# Patient Record
Sex: MALE | Race: WHITE | URBAN - METROPOLITAN AREA
[De-identification: names, ages, dates, MRNs, and addresses within clinical notes are randomized per-mention and may not be internally consistent; named-entity substitution may affect disease eponyms.]

---

## 2019-12-31 ENCOUNTER — OFFICE VISIT (OUTPATIENT)
Dept: ORTHOPEDIC SURGERY | Age: 64
End: 2019-12-31
Payer: COMMERCIAL

## 2019-12-31 VITALS
HEART RATE: 79 BPM | DIASTOLIC BLOOD PRESSURE: 71 MMHG | HEIGHT: 71 IN | SYSTOLIC BLOOD PRESSURE: 123 MMHG | WEIGHT: 180 LBS | BODY MASS INDEX: 25.2 KG/M2

## 2019-12-31 DIAGNOSIS — M25.562 LEFT KNEE PAIN, UNSPECIFIED CHRONICITY: Primary | ICD-10-CM

## 2019-12-31 PROCEDURE — 99203 OFFICE O/P NEW LOW 30 MIN: CPT | Performed by: ORTHOPAEDIC SURGERY

## 2020-01-17 ENCOUNTER — OFFICE VISIT (OUTPATIENT)
Dept: ORTHOPEDIC SURGERY | Age: 65
End: 2020-01-17
Payer: COMMERCIAL

## 2020-01-17 VITALS — BODY MASS INDEX: 25.19 KG/M2 | HEIGHT: 71 IN | WEIGHT: 179.9 LBS

## 2020-01-17 PROCEDURE — 99213 OFFICE O/P EST LOW 20 MIN: CPT | Performed by: ORTHOPAEDIC SURGERY

## 2020-01-17 RX ORDER — MELOXICAM 15 MG/1
15 TABLET ORAL DAILY
Qty: 90 TABLET | Refills: 1 | Status: SHIPPED | OUTPATIENT
Start: 2020-01-17

## 2020-01-17 NOTE — PROGRESS NOTES
Chief Complaint  Follow-up (Left Knee: MRI review )      History of Present Illness:  Aditi Vieyra is a 60 y.o. y/o male who presents today for follow up of his left knee. He is here today to review his MRI. He continues having some medial sided pain, but only very occasional catching. He has been taking some ibuprofen off and on. Medical History  History reviewed. No pertinent past medical history. History reviewed. No pertinent surgical history. Social History     Socioeconomic History    Marital status:      Spouse name: Not on file    Number of children: Not on file    Years of education: Not on file    Highest education level: Not on file   Occupational History    Not on file   Social Needs    Financial resource strain: Not on file    Food insecurity:     Worry: Not on file     Inability: Not on file    Transportation needs:     Medical: Not on file     Non-medical: Not on file   Tobacco Use    Smoking status: Never Smoker    Smokeless tobacco: Never Used   Substance and Sexual Activity    Alcohol use: Not on file    Drug use: Not on file    Sexual activity: Not on file   Lifestyle    Physical activity:     Days per week: Not on file     Minutes per session: Not on file    Stress: Not on file   Relationships    Social connections:     Talks on phone: Not on file     Gets together: Not on file     Attends [employer] service: Not on file     Active member of club or organization: Not on file     Attends meetings of clubs or organizations: Not on file     Relationship status: Not on file    Intimate partner violence:     Fear of current or ex partner: Not on file     Emotionally abused: Not on file     Physically abused: Not on file     Forced sexual activity: Not on file   Other Topics Concern    Not on file   Social History Narrative    Not on file       History reviewed. No pertinent family history.      Review of Systems  Pertinent items are noted in HPI  Review of systems osteochondral defect of the    patellar or trochlear cartilage.  Mild peripheral spurring of the medial and lateral patellar    facets.       Enthesophyte formation of the patella at the distal quadriceps tendon attachment, with mild    adjacent tendinopathy. The patellar tendon and flexor mechanism are unremarkable.       No traumatic tear or injury of the ACL, PCL, MCL, or lateral collateral complex.  No    posterolateral or posteromedial corner injury.       Chronic, complex flap tear of the medial meniscus, extending from posterior root to midbody,    3-3.5 cm in length, with large, complex parameniscal pseudocyst formation projecting from the    posterior body-horn junction; cyst measures 3.9 x 1 x 2 cm, extending along the entirety of the    posteromedial joint line.  No acute, active, or traumatic lateral meniscus tear.       Trace joint effusion, with no intra-articular bodies.       Mild to moderate chondral thinning of the medial femoral condyle and tibial plateau    weight-bearing surfaces, without penetration of the subchondral plate or reactive osteoedema.     Mild joint line and notch spurring.       Femorotibial shift present. Mild chondral thinning of the lateral femorotibial weight-bearing    surfaces, with minimal joint line and notch spurring.       No acute or traumatic bony injury.       No acute or traumatic muscle tear or injury.  Mild pes anserine bursitis.  Baker's cyst,    measuring 4.8 cm in craniocaudal height.  The neurovascular bundle is intact.                   CONCLUSION:   1. Chronic, complex flap tear of the medial meniscus, extending from posterior root to midbody,    3-3.5 cm in length, with large, complex parameniscal pseudocyst formation projecting from the    posterior body-horn junction. 2. 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    3. Mild pes anserine bursitis, with adjacent Baker's cyst.   4. No traumatic lateral meniscus tear. No traumatic cruciate or collateral ligament injury. Assessment:  44-year-old male with mild osteoarthritis of the left knee and a medial meniscus tear    Office Procedures:  No orders of the defined types were placed in this encounter. Plan:   -Discussed his condition at length. We discussed the natural history of this condition as well as treatment options both surgical nonsurgical.  At this point he wishes to avoid surgery if possible. He also wishes to avoid an injection unless absolutely necessary.  -We will provide him with a prescription for meloxicam at this time. We discussed medications, ice, activity modification. We discussed this recommendation risk. We discussed use not take any additional NSAIDs  The patient was advised that NSAID-type medications have two very important potential side effects: gastrointestinal irritation including hemorrhage and renal injuries. They were asked to take the medication with food and to stop if they experience any GI upset. They were instructed to call for vomiting, abdominal pain or black/bloody stools. Renal function testing should be provided per primary care provider periodically. The patient expresses understanding of these issues and questions were answered. -If in 1 month he continues having significant symptoms we will see him back and provide him with a corticosteroid injection in his knee  -We discussed the possibility of surgery if he continues having symptoms with a knee arthroscopy and partial medial meniscectomy  -If there are issues interim he will contact the office    MD Lorena Galarza 60 partner of [facility])          Voice Recognition Dictation disclaimer: Please note that portions of this chart were generated using Dragon dictation software.  Although every effort was made to ensure the accuracy of this automated transcription, some errors in transcription may have

## 2020-02-14 ENCOUNTER — OFFICE VISIT (OUTPATIENT)
Dept: ORTHOPEDIC SURGERY | Age: 65
End: 2020-02-14
Payer: COMMERCIAL

## 2020-02-14 VITALS — BODY MASS INDEX: 25.19 KG/M2 | HEIGHT: 71 IN | WEIGHT: 179.9 LBS

## 2020-02-14 PROCEDURE — 20610 DRAIN/INJ JOINT/BURSA W/O US: CPT | Performed by: ORTHOPAEDIC SURGERY

## 2020-02-14 PROCEDURE — 99213 OFFICE O/P EST LOW 20 MIN: CPT | Performed by: ORTHOPAEDIC SURGERY

## 2020-02-14 RX ORDER — TRIAMCINOLONE ACETONIDE 40 MG/ML
40 INJECTION, SUSPENSION INTRA-ARTICULAR; INTRAMUSCULAR ONCE
Status: COMPLETED | OUTPATIENT
Start: 2020-02-14 | End: 2020-02-14

## 2020-02-14 RX ADMIN — TRIAMCINOLONE ACETONIDE 40 MG: 40 INJECTION, SUSPENSION INTRA-ARTICULAR; INTRAMUSCULAR at 11:10

## 2020-02-14 NOTE — PROGRESS NOTES
History Narrative    Not on file       History reviewed. No pertinent family history. Review of Systems  Pertinent items are noted in HPI  Review of systems reviewed from Patient History Form dated on 12/31/2019 and available in the patient's chart under the Media tab. Vital Signs  There were no vitals filed for this visit. General Exam:   Constitutional: Patient is adequately groomed with no evidence of malnutrition  Mental Status: The patient is oriented to time, place and person. The patient's mood and affect are appropriate. Lymphatic: The lymphatic examination bilaterally reveals all areas to be without enlargement or induration. Neurological: The patient has good coordination. There is no weakness or sensory deficit. Gait: Mildly antalgic    Left knee examination  Inspection: No effusion, no erythema    Palpation: Tender medial joint line, no lateral tenderness today    Range of Motion: 0-140    Sensation: In tact to light touch all nerve distributions     Strength: Knee flexion extension motor intact with good quad tone and a normal distal motor exam    Special Tests: Positive pain with Erica, stable anterior posterior drawer, stable Lachman, stable varus and valgus    Skin: There are no additional worrisome rashes, ulcerations or lesions. Circulation normal    Additional Examinations:  Right Lower Extremity: Examination of the right lower extremity does not show any tenderness, deformity or injury. Range of motion is unremarkable. There is no gross instability. There are no rashes, ulcerations or lesions.   Strength and tone are normal.      Radiology:     X-rays obtained and reviewed in office:  No new imaging      Assessment:  57-year-old male with left knee medial meniscus tear mild osteoarthritis    Office Procedures:  Orders Placed This Encounter   Procedures    ND ARTHROCENTESIS ASPIR&/INJ MAJOR JT/BURSA W/O US       Plan:   -We will proceed today with a corticosteroid injection for acute pain relief  -We discussed continue with ice, moderate exercise if able, activity modification, meloxicam  -If he continues having symptoms we will see him back in 3 6 months for repeat evaluation. We did discuss the possibility of arthroscopic surgery in the future if there are issues interim he will contact the office    Left Knee corticosteroid injection    After informed consent was provided including a discussion of risks such as infection, alternative treatments, and benefits verbal consent was obtained. The patient was seated on the exam table with the Left knee(s) flexed to 90 degrees. The anterolateral aspect of the knee adjacent to the joint line was prepped with Betadine and alcohol. A 22-gauge needle was inserted into the  Left knee and a mixture of 5 mL of 1% lidocaine and 2 ml of Kenalog was injected. The needle was withdrawn and the puncture site was cleaned with alcohol and sealed with a Band-Aid. The patient tolerated the procedure well. Basim Hartley MD  [id number] [employer] partner of [facility] ([facility])        Voice Recognition Dictation disclaimer: Please note that portions of this chart were generated using Dragon dictation software. Although every effort was made to ensure the accuracy of this automated transcription, some errors in transcription may have occurred.